# Patient Record
Sex: MALE | Race: OTHER | HISPANIC OR LATINO | URBAN - METROPOLITAN AREA
[De-identification: names, ages, dates, MRNs, and addresses within clinical notes are randomized per-mention and may not be internally consistent; named-entity substitution may affect disease eponyms.]

---

## 2018-03-17 ENCOUNTER — INPATIENT (INPATIENT)
Facility: HOSPITAL | Age: 38
LOS: 2 days | Discharge: HOME | DRG: 494 | End: 2018-03-20
Attending: ORTHOPAEDIC SURGERY | Admitting: ORTHOPAEDIC SURGERY
Payer: COMMERCIAL

## 2018-03-17 VITALS
TEMPERATURE: 100 F | SYSTOLIC BLOOD PRESSURE: 129 MMHG | RESPIRATION RATE: 18 BRPM | HEART RATE: 82 BPM | DIASTOLIC BLOOD PRESSURE: 78 MMHG | HEIGHT: 68.9 IN | OXYGEN SATURATION: 97 % | WEIGHT: 176.37 LBS

## 2018-03-17 DIAGNOSIS — Z98.890 OTHER SPECIFIED POSTPROCEDURAL STATES: Chronic | ICD-10-CM

## 2018-03-17 LAB
ALBUMIN SERPL ELPH-MCNC: 5.1 G/DL — HIGH (ref 3.3–5)
ALP SERPL-CCNC: 96 U/L — SIGNIFICANT CHANGE UP (ref 40–120)
ALT FLD-CCNC: 23 U/L — SIGNIFICANT CHANGE UP (ref 10–45)
ANION GAP SERPL CALC-SCNC: 15 MMOL/L — SIGNIFICANT CHANGE UP (ref 5–17)
APTT BLD: 26.6 SEC — LOW (ref 27.5–37.4)
AST SERPL-CCNC: 21 U/L — SIGNIFICANT CHANGE UP (ref 10–40)
BASOPHILS NFR BLD AUTO: 0.2 % — SIGNIFICANT CHANGE UP (ref 0–2)
BILIRUB SERPL-MCNC: 0.4 MG/DL — SIGNIFICANT CHANGE UP (ref 0.2–1.2)
BLD GP AB SCN SERPL QL: NEGATIVE — SIGNIFICANT CHANGE UP
BUN SERPL-MCNC: 16 MG/DL — SIGNIFICANT CHANGE UP (ref 7–23)
CALCIUM SERPL-MCNC: 9.8 MG/DL — SIGNIFICANT CHANGE UP (ref 8.4–10.5)
CHLORIDE SERPL-SCNC: 100 MMOL/L — SIGNIFICANT CHANGE UP (ref 96–108)
CO2 SERPL-SCNC: 24 MMOL/L — SIGNIFICANT CHANGE UP (ref 22–31)
CREAT SERPL-MCNC: 0.85 MG/DL — SIGNIFICANT CHANGE UP (ref 0.5–1.3)
EOSINOPHIL NFR BLD AUTO: 1 % — SIGNIFICANT CHANGE UP (ref 0–6)
GLUCOSE SERPL-MCNC: 105 MG/DL — HIGH (ref 70–99)
HCT VFR BLD CALC: 43 % — SIGNIFICANT CHANGE UP (ref 39–50)
HGB BLD-MCNC: 14.5 G/DL — SIGNIFICANT CHANGE UP (ref 13–17)
INR BLD: 1.08 — SIGNIFICANT CHANGE UP (ref 0.88–1.16)
LYMPHOCYTES # BLD AUTO: 16.7 % — SIGNIFICANT CHANGE UP (ref 13–44)
MCHC RBC-ENTMCNC: 28.8 PG — SIGNIFICANT CHANGE UP (ref 27–34)
MCHC RBC-ENTMCNC: 33.7 G/DL — SIGNIFICANT CHANGE UP (ref 32–36)
MCV RBC AUTO: 85.3 FL — SIGNIFICANT CHANGE UP (ref 80–100)
MONOCYTES NFR BLD AUTO: 5.8 % — SIGNIFICANT CHANGE UP (ref 2–14)
NEUTROPHILS NFR BLD AUTO: 76.3 % — SIGNIFICANT CHANGE UP (ref 43–77)
PLATELET # BLD AUTO: 199 K/UL — SIGNIFICANT CHANGE UP (ref 150–400)
POTASSIUM SERPL-MCNC: 4 MMOL/L — SIGNIFICANT CHANGE UP (ref 3.5–5.3)
POTASSIUM SERPL-SCNC: 4 MMOL/L — SIGNIFICANT CHANGE UP (ref 3.5–5.3)
PROT SERPL-MCNC: 8.1 G/DL — SIGNIFICANT CHANGE UP (ref 6–8.3)
PROTHROM AB SERPL-ACNC: 12 SEC — SIGNIFICANT CHANGE UP (ref 9.8–12.7)
RBC # BLD: 5.04 M/UL — SIGNIFICANT CHANGE UP (ref 4.2–5.8)
RBC # FLD: 13.3 % — SIGNIFICANT CHANGE UP (ref 10.3–16.9)
RH IG SCN BLD-IMP: POSITIVE — SIGNIFICANT CHANGE UP
RH IG SCN BLD-IMP: POSITIVE — SIGNIFICANT CHANGE UP
SODIUM SERPL-SCNC: 139 MMOL/L — SIGNIFICANT CHANGE UP (ref 135–145)
WBC # BLD: 11.4 K/UL — HIGH (ref 3.8–10.5)
WBC # FLD AUTO: 11.4 K/UL — HIGH (ref 3.8–10.5)

## 2018-03-17 PROCEDURE — 73590 X-RAY EXAM OF LOWER LEG: CPT | Mod: 26,LT

## 2018-03-17 PROCEDURE — 73610 X-RAY EXAM OF ANKLE: CPT | Mod: 26,LT

## 2018-03-17 PROCEDURE — 71045 X-RAY EXAM CHEST 1 VIEW: CPT | Mod: 26

## 2018-03-17 PROCEDURE — 99285 EMERGENCY DEPT VISIT HI MDM: CPT | Mod: 25

## 2018-03-17 PROCEDURE — 93010 ELECTROCARDIOGRAM REPORT: CPT

## 2018-03-17 RX ORDER — MORPHINE SULFATE 50 MG/1
2 CAPSULE, EXTENDED RELEASE ORAL EVERY 4 HOURS
Qty: 0 | Refills: 0 | Status: DISCONTINUED | OUTPATIENT
Start: 2018-03-17 | End: 2018-03-18

## 2018-03-17 RX ORDER — MORPHINE SULFATE 50 MG/1
4 CAPSULE, EXTENDED RELEASE ORAL ONCE
Qty: 0 | Refills: 0 | Status: DISCONTINUED | OUTPATIENT
Start: 2018-03-17 | End: 2018-03-17

## 2018-03-17 RX ORDER — MORPHINE SULFATE 50 MG/1
6 CAPSULE, EXTENDED RELEASE ORAL ONCE
Qty: 0 | Refills: 0 | Status: DISCONTINUED | OUTPATIENT
Start: 2018-03-17 | End: 2018-03-17

## 2018-03-17 RX ORDER — HYDROMORPHONE HYDROCHLORIDE 2 MG/ML
0.5 INJECTION INTRAMUSCULAR; INTRAVENOUS; SUBCUTANEOUS EVERY 6 HOURS
Qty: 0 | Refills: 0 | Status: DISCONTINUED | OUTPATIENT
Start: 2018-03-17 | End: 2018-03-18

## 2018-03-17 RX ORDER — OXYCODONE HYDROCHLORIDE 5 MG/1
10 TABLET ORAL EVERY 4 HOURS
Qty: 0 | Refills: 0 | Status: DISCONTINUED | OUTPATIENT
Start: 2018-03-17 | End: 2018-03-18

## 2018-03-17 RX ORDER — ACETAMINOPHEN 500 MG
650 TABLET ORAL EVERY 6 HOURS
Qty: 0 | Refills: 0 | Status: DISCONTINUED | OUTPATIENT
Start: 2018-03-17 | End: 2018-03-20

## 2018-03-17 RX ORDER — OXYCODONE HYDROCHLORIDE 5 MG/1
5 TABLET ORAL EVERY 4 HOURS
Qty: 0 | Refills: 0 | Status: DISCONTINUED | OUTPATIENT
Start: 2018-03-17 | End: 2018-03-18

## 2018-03-17 RX ORDER — METOCLOPRAMIDE HCL 10 MG
10 TABLET ORAL EVERY 6 HOURS
Qty: 0 | Refills: 0 | Status: DISCONTINUED | OUTPATIENT
Start: 2018-03-17 | End: 2018-03-20

## 2018-03-17 RX ORDER — ZOLPIDEM TARTRATE 10 MG/1
5 TABLET ORAL AT BEDTIME
Qty: 0 | Refills: 0 | Status: DISCONTINUED | OUTPATIENT
Start: 2018-03-17 | End: 2018-03-20

## 2018-03-17 RX ORDER — MAGNESIUM HYDROXIDE 400 MG/1
30 TABLET, CHEWABLE ORAL DAILY
Qty: 0 | Refills: 0 | Status: DISCONTINUED | OUTPATIENT
Start: 2018-03-17 | End: 2018-03-20

## 2018-03-17 RX ORDER — SODIUM CHLORIDE 9 MG/ML
1000 INJECTION, SOLUTION INTRAVENOUS
Qty: 0 | Refills: 0 | Status: DISCONTINUED | OUTPATIENT
Start: 2018-03-17 | End: 2018-03-20

## 2018-03-17 RX ORDER — INFLUENZA VIRUS VACCINE 15; 15; 15; 15 UG/.5ML; UG/.5ML; UG/.5ML; UG/.5ML
0.5 SUSPENSION INTRAMUSCULAR ONCE
Qty: 0 | Refills: 0 | Status: COMPLETED | OUTPATIENT
Start: 2018-03-17 | End: 2018-03-17

## 2018-03-17 RX ORDER — HYDROMORPHONE HYDROCHLORIDE 2 MG/ML
1 INJECTION INTRAMUSCULAR; INTRAVENOUS; SUBCUTANEOUS ONCE
Qty: 0 | Refills: 0 | Status: DISCONTINUED | OUTPATIENT
Start: 2018-03-17 | End: 2018-03-17

## 2018-03-17 RX ORDER — FAMOTIDINE 10 MG/ML
20 INJECTION INTRAVENOUS EVERY 12 HOURS
Qty: 0 | Refills: 0 | Status: DISCONTINUED | OUTPATIENT
Start: 2018-03-17 | End: 2018-03-20

## 2018-03-17 RX ADMIN — SODIUM CHLORIDE 100 MILLILITER(S): 9 INJECTION, SOLUTION INTRAVENOUS at 23:56

## 2018-03-17 RX ADMIN — Medication 650 MILLIGRAM(S): at 23:52

## 2018-03-17 RX ADMIN — MORPHINE SULFATE 4 MILLIGRAM(S): 50 CAPSULE, EXTENDED RELEASE ORAL at 20:25

## 2018-03-17 RX ADMIN — MORPHINE SULFATE 4 MILLIGRAM(S): 50 CAPSULE, EXTENDED RELEASE ORAL at 17:50

## 2018-03-17 RX ADMIN — FAMOTIDINE 20 MILLIGRAM(S): 10 INJECTION INTRAVENOUS at 23:57

## 2018-03-17 RX ADMIN — HYDROMORPHONE HYDROCHLORIDE 1 MILLIGRAM(S): 2 INJECTION INTRAMUSCULAR; INTRAVENOUS; SUBCUTANEOUS at 21:39

## 2018-03-17 RX ADMIN — MORPHINE SULFATE 2 MILLIGRAM(S): 50 CAPSULE, EXTENDED RELEASE ORAL at 23:52

## 2018-03-17 NOTE — PROGRESS NOTE ADULT - SUBJECTIVE AND OBJECTIVE BOX
Ortho Preop Note    Patient is a 37y old  Male who presents with a chief complaint of   Diagnosis: L ankle fx  Procedure: L ankle ORIF   Surgeon: chase                           14.5   11.4  )-----------( 199      ( 17 Mar 2018 20:46 )             43.0     03-17    139  |  100  |  16  ----------------------------<  105<H>  4.0   |  24  |  0.85    Ca    9.8      17 Mar 2018 20:46    TPro  8.1  /  Alb  5.1<H>  /  TBili  0.4  /  DBili  x   /  AST  21  /  ALT  23  /  AlkPhos  96  03-17    PT/INR - ( 17 Mar 2018 20:46 )   PT: 12.0 sec;   INR: 1.08          PTT - ( 17 Mar 2018 20:46 )  PTT:26.6 sec      [ ] Type & Screen  [x ] CBC  [x ] BMP  [ ] PT/PTT/INR  [ ] Urinalysis  [ ] Chest X-ray  [x ] EKG  [x ] NPO/IVF  [x ] Consent  [ ] Clearance  [x ] Added on to OR Schedule  [ ] Anti-coagulation held  [ ] MRSA/MSSA Nasal Screen     Assessment & Plan:  37yMale with (L ankle fx  -For OR 3/18     Ortho Pager [phone]

## 2018-03-17 NOTE — ED PROVIDER NOTE - OBJECTIVE STATEMENT
37 m without significant PMHx BIBA after fall while ice skating; pt was doing  a turn to the right when his skate became caught on the ice, and he twisted his left ankle; he heard a loud crack.  Pain is severe.  Has some numbness in the foot.  Denies head injury or injuries to any other parts of his body. 37 m without significant PMHx BIBA after fall while ice skating; pt was doing  a turn to the right when his skate became caught on the ice, and he twisted his left ankle; he heard a loud crack.  Pain is severe.  Worse with even minimal movement, unable to walk. Has some numbness in the foot.  Denies head injury or injuries to any other parts of his body.

## 2018-03-17 NOTE — ED PROVIDER NOTE - NS ED ROS FT
NEURO: no LOC, headache, dizziness, weakness, focal weakness/tingling/numbness   EYE: no change in vision or eye injury   ENT: no epistaxis, rhinorrhea, otorrhea, dental injury   PULM: no difficulty breathing   CV: no chest pain or palpitations; no syncope   GI: no abdominal pain or vomiting   : no pelvic pain, no genital injury   MSK: no neck or back pain  SKIN: no abrasions or lacerations   IMMUN: tetanus up to date

## 2018-03-17 NOTE — ED ADULT NURSE NOTE - OBJECTIVE STATEMENT
pt to ER via EMS w/ report of falling while ice skating and injuring L. ankle.  Pt denies striking head or other injury.  Swelling and deformity noted to L. malleolus.  Pt denies cp/sob/f/c/n/v.  Breathing unlabored, skin warm and dry. Will continue to monitor.

## 2018-03-17 NOTE — H&P ADULT - ASSESSMENT
37M with L SER-IV equivalent ankle fracture, to OR tomorrow for ORIF     Patient was consented for ankle block and closed reduction of the L ankle in the ER. The reduction was performed with ER assistants and splint applied. Post-splint images demonstrated better anatomic alignment of the mortise. Physical exam was repeated after splint with no interval change.     Admit to Orthopaedics  NPO at midnight  IVF  Pain control  Labs  UA  type and scree   EKG  Discussed with attending, Dr. Beck

## 2018-03-17 NOTE — H&P ADULT - NSHPLABSRESULTS_GEN_ALL_CORE
XR: L ankle SER IV equivalent fracture with Medial clear space widening and fracture through distal fibula

## 2018-03-17 NOTE — H&P ADULT - NSHPPHYSICALEXAM_GEN_ALL_CORE
NAD AAO3   LLE: diffuse swelling around both malleoli, ttp, no leilani ecchymosis  SILT s/s/sp/dp/t, +FHL/EHL. PF/DF limited 2/2 pain. +KE/KF  +PT, toes wwp

## 2018-03-17 NOTE — H&P ADULT - HISTORY OF PRESENT ILLNESS
37M otherwise healthy, visiting [facility] with wife, p/w L ankle pain after sustaining twisting injury at [place] while ice skating. Immediately pain upon impact with inability to walk sencondary to pain. Patient previously ambulated indepdently. No LOC, no head trauma. Denies numbness/tingling in foot. No N/V/F/C.

## 2018-03-17 NOTE — PATIENT PROFILE ADULT. - IF HCP IS NOT ON CHART, IDENTIFY THE PERSONS NAME AND PHONE NUMBER CONTACTED TO BRING IN DOCUMENT
[place] patient; spouse is staying [facility] room [facility]

## 2018-03-17 NOTE — ED PROVIDER NOTE - MEDICAL DECISION MAKING DETAILS
Isolated left ankle injury, likely fracture.  NVI.  No evidence of head/neck or other injuries. Will obtain XR.

## 2018-03-17 NOTE — ED PROVIDER NOTE - PHYSICAL EXAMINATION
CONSTITUTIONAL: WD,WN. NAD.    SKIN: Normal color and turgor. No rash.    HEAD: NC/AT.  EYES: Conjunctiva clear. EOMI. PERRL.    ENT: Airway patent, OP without erythema, tonsillar swelling or exudate; uvula midline without swelling. Nasal mucosa clear, no rhinorrhea.   RESPIRATORY:  Breathing non-labored. No retractions or accessory muscle use.  Lungs CTA bilat.  CARDIOVASCULAR:  RRR, S1S2. No M/R/G.      GI:  Abdomen soft, nontender.    MSK: Neck supple with painless ROM.  No midline neck tenderness. Left ankle very swollen and focally tender over lateral malleolus.  No calf tenderness.  No foot tenderness.  Strong DP and PT pulses, toes WWP.  Able to wiggle toes.  Foot and toes SILT.  NEURO: Alert and oriented; 5/5 strength in all extremities.

## 2018-03-18 LAB
ANION GAP SERPL CALC-SCNC: 14 MMOL/L — SIGNIFICANT CHANGE UP (ref 5–17)
APPEARANCE UR: CLEAR — SIGNIFICANT CHANGE UP
BILIRUB UR-MCNC: NEGATIVE — SIGNIFICANT CHANGE UP
BUN SERPL-MCNC: 15 MG/DL — SIGNIFICANT CHANGE UP (ref 7–23)
CALCIUM SERPL-MCNC: 9.3 MG/DL — SIGNIFICANT CHANGE UP (ref 8.4–10.5)
CHLORIDE SERPL-SCNC: 97 MMOL/L — SIGNIFICANT CHANGE UP (ref 96–108)
CO2 SERPL-SCNC: 25 MMOL/L — SIGNIFICANT CHANGE UP (ref 22–31)
COLOR SPEC: YELLOW — SIGNIFICANT CHANGE UP
CREAT SERPL-MCNC: 0.82 MG/DL — SIGNIFICANT CHANGE UP (ref 0.5–1.3)
DIFF PNL FLD: NEGATIVE — SIGNIFICANT CHANGE UP
GLUCOSE SERPL-MCNC: 138 MG/DL — HIGH (ref 70–99)
GLUCOSE UR QL: NEGATIVE — SIGNIFICANT CHANGE UP
HCT VFR BLD CALC: 40.6 % — SIGNIFICANT CHANGE UP (ref 39–50)
HGB BLD-MCNC: 13.4 G/DL — SIGNIFICANT CHANGE UP (ref 13–17)
INR BLD: 1.06 — SIGNIFICANT CHANGE UP (ref 0.88–1.16)
KETONES UR-MCNC: NEGATIVE — SIGNIFICANT CHANGE UP
LEUKOCYTE ESTERASE UR-ACNC: NEGATIVE — SIGNIFICANT CHANGE UP
MCHC RBC-ENTMCNC: 29.2 PG — SIGNIFICANT CHANGE UP (ref 27–34)
MCHC RBC-ENTMCNC: 33 G/DL — SIGNIFICANT CHANGE UP (ref 32–36)
MCV RBC AUTO: 88.5 FL — SIGNIFICANT CHANGE UP (ref 80–100)
MRSA PCR RESULT.: NEGATIVE — SIGNIFICANT CHANGE UP
NITRITE UR-MCNC: NEGATIVE — SIGNIFICANT CHANGE UP
PH UR: 6 — SIGNIFICANT CHANGE UP (ref 5–8)
PLATELET # BLD AUTO: 161 K/UL — SIGNIFICANT CHANGE UP (ref 150–400)
POTASSIUM SERPL-MCNC: 4.1 MMOL/L — SIGNIFICANT CHANGE UP (ref 3.5–5.3)
POTASSIUM SERPL-SCNC: 4.1 MMOL/L — SIGNIFICANT CHANGE UP (ref 3.5–5.3)
PROT UR-MCNC: NEGATIVE MG/DL — SIGNIFICANT CHANGE UP
PROTHROM AB SERPL-ACNC: 11.8 SEC — SIGNIFICANT CHANGE UP (ref 9.8–12.7)
RBC # BLD: 4.59 M/UL — SIGNIFICANT CHANGE UP (ref 4.2–5.8)
RBC # FLD: 13.4 % — SIGNIFICANT CHANGE UP (ref 10.3–16.9)
S AUREUS DNA NOSE QL NAA+PROBE: POSITIVE
SODIUM SERPL-SCNC: 136 MMOL/L — SIGNIFICANT CHANGE UP (ref 135–145)
SP GR SPEC: 1.02 — SIGNIFICANT CHANGE UP (ref 1–1.03)
UROBILINOGEN FLD QL: 0.2 E.U./DL — SIGNIFICANT CHANGE UP
WBC # BLD: 12.8 K/UL — HIGH (ref 3.8–10.5)
WBC # FLD AUTO: 12.8 K/UL — HIGH (ref 3.8–10.5)

## 2018-03-18 RX ORDER — ENOXAPARIN SODIUM 100 MG/ML
40 INJECTION SUBCUTANEOUS DAILY
Qty: 0 | Refills: 0 | Status: DISCONTINUED | OUTPATIENT
Start: 2018-03-18 | End: 2018-03-20

## 2018-03-18 RX ORDER — HYDROMORPHONE HYDROCHLORIDE 2 MG/ML
0.5 INJECTION INTRAMUSCULAR; INTRAVENOUS; SUBCUTANEOUS
Qty: 0 | Refills: 0 | Status: DISCONTINUED | OUTPATIENT
Start: 2018-03-18 | End: 2018-03-20

## 2018-03-18 RX ORDER — CEFAZOLIN SODIUM 1 G
2000 VIAL (EA) INJECTION EVERY 8 HOURS
Qty: 0 | Refills: 0 | Status: COMPLETED | OUTPATIENT
Start: 2018-03-19 | End: 2018-03-19

## 2018-03-18 RX ORDER — OXYCODONE HYDROCHLORIDE 5 MG/1
10 TABLET ORAL EVERY 4 HOURS
Qty: 0 | Refills: 0 | Status: DISCONTINUED | OUTPATIENT
Start: 2018-03-18 | End: 2018-03-20

## 2018-03-18 RX ORDER — SODIUM CHLORIDE 9 MG/ML
1000 INJECTION, SOLUTION INTRAVENOUS
Qty: 0 | Refills: 0 | Status: DISCONTINUED | OUTPATIENT
Start: 2018-03-18 | End: 2018-03-20

## 2018-03-18 RX ORDER — OXYCODONE HYDROCHLORIDE 5 MG/1
5 TABLET ORAL EVERY 4 HOURS
Qty: 0 | Refills: 0 | Status: DISCONTINUED | OUTPATIENT
Start: 2018-03-18 | End: 2018-03-20

## 2018-03-18 RX ORDER — ONDANSETRON 8 MG/1
4 TABLET, FILM COATED ORAL EVERY 6 HOURS
Qty: 0 | Refills: 0 | Status: DISCONTINUED | OUTPATIENT
Start: 2018-03-18 | End: 2018-03-20

## 2018-03-18 RX ADMIN — OXYCODONE HYDROCHLORIDE 10 MILLIGRAM(S): 5 TABLET ORAL at 03:38

## 2018-03-18 RX ADMIN — HYDROMORPHONE HYDROCHLORIDE 0.5 MILLIGRAM(S): 2 INJECTION INTRAMUSCULAR; INTRAVENOUS; SUBCUTANEOUS at 19:35

## 2018-03-18 RX ADMIN — ZOLPIDEM TARTRATE 5 MILLIGRAM(S): 10 TABLET ORAL at 01:59

## 2018-03-18 RX ADMIN — OXYCODONE HYDROCHLORIDE 10 MILLIGRAM(S): 5 TABLET ORAL at 11:39

## 2018-03-18 RX ADMIN — HYDROMORPHONE HYDROCHLORIDE 0.5 MILLIGRAM(S): 2 INJECTION INTRAMUSCULAR; INTRAVENOUS; SUBCUTANEOUS at 00:54

## 2018-03-18 RX ADMIN — Medication 650 MILLIGRAM(S): at 22:45

## 2018-03-18 RX ADMIN — HYDROMORPHONE HYDROCHLORIDE 0.5 MILLIGRAM(S): 2 INJECTION INTRAMUSCULAR; INTRAVENOUS; SUBCUTANEOUS at 20:10

## 2018-03-18 RX ADMIN — Medication 650 MILLIGRAM(S): at 06:25

## 2018-03-18 RX ADMIN — MORPHINE SULFATE 2 MILLIGRAM(S): 50 CAPSULE, EXTENDED RELEASE ORAL at 03:46

## 2018-03-18 RX ADMIN — Medication 650 MILLIGRAM(S): at 12:01

## 2018-03-18 RX ADMIN — MORPHINE SULFATE 2 MILLIGRAM(S): 50 CAPSULE, EXTENDED RELEASE ORAL at 00:07

## 2018-03-18 RX ADMIN — HYDROMORPHONE HYDROCHLORIDE 0.5 MILLIGRAM(S): 2 INJECTION INTRAMUSCULAR; INTRAVENOUS; SUBCUTANEOUS at 01:09

## 2018-03-18 RX ADMIN — MORPHINE SULFATE 2 MILLIGRAM(S): 50 CAPSULE, EXTENDED RELEASE ORAL at 05:01

## 2018-03-18 RX ADMIN — OXYCODONE HYDROCHLORIDE 10 MILLIGRAM(S): 5 TABLET ORAL at 12:02

## 2018-03-18 RX ADMIN — Medication 650 MILLIGRAM(S): at 00:52

## 2018-03-18 RX ADMIN — Medication 650 MILLIGRAM(S): at 21:45

## 2018-03-18 RX ADMIN — MORPHINE SULFATE 2 MILLIGRAM(S): 50 CAPSULE, EXTENDED RELEASE ORAL at 16:18

## 2018-03-18 RX ADMIN — Medication 650 MILLIGRAM(S): at 11:41

## 2018-03-18 RX ADMIN — HYDROMORPHONE HYDROCHLORIDE 0.5 MILLIGRAM(S): 2 INJECTION INTRAMUSCULAR; INTRAVENOUS; SUBCUTANEOUS at 21:45

## 2018-03-18 RX ADMIN — OXYCODONE HYDROCHLORIDE 10 MILLIGRAM(S): 5 TABLET ORAL at 02:38

## 2018-03-18 RX ADMIN — HYDROMORPHONE HYDROCHLORIDE 0.5 MILLIGRAM(S): 2 INJECTION INTRAMUSCULAR; INTRAVENOUS; SUBCUTANEOUS at 20:11

## 2018-03-18 RX ADMIN — Medication 650 MILLIGRAM(S): at 05:25

## 2018-03-18 RX ADMIN — MORPHINE SULFATE 2 MILLIGRAM(S): 50 CAPSULE, EXTENDED RELEASE ORAL at 15:58

## 2018-03-18 NOTE — BRIEF OPERATIVE NOTE - PROCEDURE
<<-----Click on this checkbox to enter Procedure ORIF fracture of left ankle  03/18/2018    Active  RAHEEM

## 2018-03-18 NOTE — PROGRESS NOTE ADULT - SUBJECTIVE AND OBJECTIVE BOX
Ortho Note    Pain contrlled. REady for OR today.   Denies CP, SOB, N/V, numbness/tingling     Vital Signs Last 24 Hrs  T(C): 36.8 (03-18-18 @ 05:16), Max: 36.8 (03-18-18 @ 05:16)  T(F): 98.2 (03-18-18 @ 05:16), Max: 98.2 (03-18-18 @ 05:16)  HR: 72 (03-18-18 @ 05:16) (72 - 88)  BP: 120/83 (03-18-18 @ 05:16) (120/83 - 136/88)  BP(mean): --  RR: 18 (03-18-18 @ 05:16) (18 - 18)  SpO2: 97% (03-18-18 @ 05:16) (97% - 97%)  AVSS    General: Pt Alert and oriented, NAD  Splint c/d/i, elevated.   Toes wwp   [place] distally.   +EHL/FHL                           13.4   12.8  )-----------( 161      ( 18 Mar 2018 06:30 )             40.6   18 Mar 2018 06:30    136    |  97     |  15     ----------------------------<  138    4.1     |  25     |  0.82     Ca    9.3        18 Mar 2018 06:30    TPro  8.1    /  Alb  5.1    /  TBili  0.4    /  DBili  x      /  AST  21     /  ALT  23     /  AlkPhos  96     17 Mar 2018 20:46      A/P: 37yMale s/p L ankle fx   - OR today   - Stable  - Pain Control  - PT, WBS: NWB    Ortho Pager [phone]

## 2018-03-18 NOTE — PROGRESS NOTE ADULT - SUBJECTIVE AND OBJECTIVE BOX
Orthopaedics Post Op Check    Procedure: L Ankle ORIF  Surgeon: Dr Beck    Pt comfortable, without complaints  Denies CP, SOB, N/V, numbness/tingling     Vital Signs Last 24 Hrs  T(C): 37 (18 Mar 2018 21:31), Max: 37 (18 Mar 2018 21:31)  T(F): 98.6 (18 Mar 2018 21:31), Max: 98.6 (18 Mar 2018 21:31)  HR: 86 (18 Mar 2018 21:31) (70 - 92)  BP: 115/70 (18 Mar 2018 21:31) (107/67 - 150/78)  BP(mean): 91 (18 Mar 2018 21:00) (83 - 113)  RR: 18 (18 Mar 2018 21:31) (15 - 18)  SpO2: 100% (18 Mar 2018 21:31) (96% - 100%)  AVSS, NAD    Dressing C/D/I  General: Pt Alert and oriented   wwp  Sensation: SILT  Motor: able to range digits w/ pain at sx site                          13.4   12.8  )-----------( 161      ( 18 Mar 2018 06:30 )             40.6   03-18    136  |  97  |  15  ----------------------------<  138<H>  4.1   |  25  |  0.82    Ca    9.3      18 Mar 2018 06:30    TPro  8.1  /  Alb  5.1<H>  /  TBili  0.4  /  DBili  x   /  AST  21  /  ALT  23  /  AlkPhos  96  03-17      A/P: 37yMale POD#0 s/p above  - Stable  - Pain Control  - DVT ppx  - Post op abx  - PT, WBS: NWB LLE  - F/U AM Labs

## 2018-03-19 LAB
ANION GAP SERPL CALC-SCNC: 13 MMOL/L — SIGNIFICANT CHANGE UP (ref 5–17)
BUN SERPL-MCNC: 8 MG/DL — SIGNIFICANT CHANGE UP (ref 7–23)
CALCIUM SERPL-MCNC: 9.2 MG/DL — SIGNIFICANT CHANGE UP (ref 8.4–10.5)
CHLORIDE SERPL-SCNC: 100 MMOL/L — SIGNIFICANT CHANGE UP (ref 96–108)
CO2 SERPL-SCNC: 25 MMOL/L — SIGNIFICANT CHANGE UP (ref 22–31)
CREAT SERPL-MCNC: 0.75 MG/DL — SIGNIFICANT CHANGE UP (ref 0.5–1.3)
GLUCOSE SERPL-MCNC: 173 MG/DL — HIGH (ref 70–99)
HCT VFR BLD CALC: 39.6 % — SIGNIFICANT CHANGE UP (ref 39–50)
HGB BLD-MCNC: 13 G/DL — SIGNIFICANT CHANGE UP (ref 13–17)
MCHC RBC-ENTMCNC: 29.2 PG — SIGNIFICANT CHANGE UP (ref 27–34)
MCHC RBC-ENTMCNC: 32.8 G/DL — SIGNIFICANT CHANGE UP (ref 32–36)
MCV RBC AUTO: 89 FL — SIGNIFICANT CHANGE UP (ref 80–100)
PLATELET # BLD AUTO: 146 K/UL — LOW (ref 150–400)
POTASSIUM SERPL-MCNC: 4.8 MMOL/L — SIGNIFICANT CHANGE UP (ref 3.5–5.3)
POTASSIUM SERPL-SCNC: 4.8 MMOL/L — SIGNIFICANT CHANGE UP (ref 3.5–5.3)
RBC # BLD: 4.45 M/UL — SIGNIFICANT CHANGE UP (ref 4.2–5.8)
RBC # FLD: 13.1 % — SIGNIFICANT CHANGE UP (ref 10.3–16.9)
SODIUM SERPL-SCNC: 138 MMOL/L — SIGNIFICANT CHANGE UP (ref 135–145)
WBC # BLD: 9.4 K/UL — SIGNIFICANT CHANGE UP (ref 3.8–10.5)
WBC # FLD AUTO: 9.4 K/UL — SIGNIFICANT CHANGE UP (ref 3.8–10.5)

## 2018-03-19 RX ADMIN — Medication 650 MILLIGRAM(S): at 23:05

## 2018-03-19 RX ADMIN — OXYCODONE HYDROCHLORIDE 10 MILLIGRAM(S): 5 TABLET ORAL at 13:29

## 2018-03-19 RX ADMIN — Medication 650 MILLIGRAM(S): at 05:52

## 2018-03-19 RX ADMIN — OXYCODONE HYDROCHLORIDE 10 MILLIGRAM(S): 5 TABLET ORAL at 09:36

## 2018-03-19 RX ADMIN — Medication 100 MILLIGRAM(S): at 00:00

## 2018-03-19 RX ADMIN — Medication 100 MILLIGRAM(S): at 07:34

## 2018-03-19 RX ADMIN — OXYCODONE HYDROCHLORIDE 10 MILLIGRAM(S): 5 TABLET ORAL at 14:20

## 2018-03-19 RX ADMIN — Medication 650 MILLIGRAM(S): at 23:55

## 2018-03-19 RX ADMIN — Medication 650 MILLIGRAM(S): at 13:14

## 2018-03-19 RX ADMIN — OXYCODONE HYDROCHLORIDE 10 MILLIGRAM(S): 5 TABLET ORAL at 17:45

## 2018-03-19 RX ADMIN — ENOXAPARIN SODIUM 40 MILLIGRAM(S): 100 INJECTION SUBCUTANEOUS at 12:42

## 2018-03-19 RX ADMIN — HYDROMORPHONE HYDROCHLORIDE 0.5 MILLIGRAM(S): 2 INJECTION INTRAMUSCULAR; INTRAVENOUS; SUBCUTANEOUS at 03:05

## 2018-03-19 RX ADMIN — Medication 650 MILLIGRAM(S): at 17:45

## 2018-03-19 RX ADMIN — HYDROMORPHONE HYDROCHLORIDE 0.5 MILLIGRAM(S): 2 INJECTION INTRAMUSCULAR; INTRAVENOUS; SUBCUTANEOUS at 05:52

## 2018-03-19 RX ADMIN — HYDROMORPHONE HYDROCHLORIDE 0.5 MILLIGRAM(S): 2 INJECTION INTRAMUSCULAR; INTRAVENOUS; SUBCUTANEOUS at 06:30

## 2018-03-19 RX ADMIN — OXYCODONE HYDROCHLORIDE 10 MILLIGRAM(S): 5 TABLET ORAL at 23:05

## 2018-03-19 RX ADMIN — FAMOTIDINE 20 MILLIGRAM(S): 10 INJECTION INTRAVENOUS at 05:52

## 2018-03-19 RX ADMIN — OXYCODONE HYDROCHLORIDE 10 MILLIGRAM(S): 5 TABLET ORAL at 23:55

## 2018-03-19 RX ADMIN — Medication 650 MILLIGRAM(S): at 06:30

## 2018-03-19 RX ADMIN — Medication 650 MILLIGRAM(S): at 12:36

## 2018-03-19 RX ADMIN — FAMOTIDINE 20 MILLIGRAM(S): 10 INJECTION INTRAVENOUS at 17:45

## 2018-03-19 RX ADMIN — HYDROMORPHONE HYDROCHLORIDE 0.5 MILLIGRAM(S): 2 INJECTION INTRAMUSCULAR; INTRAVENOUS; SUBCUTANEOUS at 02:43

## 2018-03-19 RX ADMIN — OXYCODONE HYDROCHLORIDE 10 MILLIGRAM(S): 5 TABLET ORAL at 10:20

## 2018-03-19 NOTE — PROGRESS NOTE ADULT - SUBJECTIVE AND OBJECTIVE BOX
ORTHO NOTE    [ x] Pt seen/examined at 925am.  [ x] Pt without any complaints/in NAD.    [ ] Pt complains of:      ROS: [ ] Fever  [ ] Chills  [ ] CP [ ] SOB [ ] Dysnea  [ ] Palpitations [ ] Cough [ ] N/V/C/D [ ] Paresthia [ ] Other     [x ] ROS  otherwise negative    .    PHYSICAL EXAM:    Vital Signs Last 24 Hrs  T(C): 36.9 (19 Mar 2018 15:38), Max: 37 (18 Mar 2018 21:31)  T(F): 98.4 (19 Mar 2018 15:38), Max: 98.6 (18 Mar 2018 21:31)  HR: 101 (19 Mar 2018 15:38) (80 - 107)  BP: 111/65 (19 Mar 2018 15:38) (107/67 - 150/78)  BP(mean): 91 (18 Mar 2018 21:00) (83 - 113)  RR: 16 (19 Mar 2018 15:38) (15 - 18)  SpO2: 94% (19 Mar 2018 15:38) (94% - 100%)    I&O's Detail    18 Mar 2018 07:01  -  19 Mar 2018 07:00  --------------------------------------------------------  IN:    lactated ringers.: 735 mL    Oral Fluid: 480 mL    Other: 1000 mL  Total IN: 2215 mL    OUT:    Voided: 950 mL  Total OUT: 950 mL    Total NET: 1265 mL      19 Mar 2018 07:01  -  19 Mar 2018 16:00  --------------------------------------------------------  IN:    Oral Fluid: 660 mL  Total IN: 660 mL    OUT:    Voided: 880 mL  Total OUT: 880 mL    Total NET: -220 mL           CAPILLARY BLOOD GLUCOSE                      Neuro: AAOx3    Lungs: CTA, IS demonstrated    CV:    ABD: soft non tender    Ext: L ankle splinted with acewrap bandage cdi, LLE nvid, elevated on pillow, motor-able to wiggle toes    LABS                        13.0   9.4   )-----------( 146      ( 19 Mar 2018 05:42 )             39.6                              PT/INR - ( 18 Mar 2018 06:30 )   PT: 11.8 sec;   INR: 1.06          PTT - ( 17 Mar 2018 20:46 )  PTT:26.6 sec  03-19    138  |  100  |  8   ----------------------------<  173<H>  4.8   |  25  |  0.75    Ca    9.2      19 Mar 2018 05:42    TPro  8.1  /  Alb  5.1<H>  /  TBili  0.4  /  DBili  x   /  AST  21  /  ALT  23  /  AlkPhos  96  03-17      [ ] Other Labs  [ ] None ordered            Please check or [place] when present:  •  Heart Failure:    [ ] Acute        [ ]  Acute on Chronic        [ ] Chronic         [ ] Diastolic     [ ]  Combined    •  NASIR:     [ ] ATN        [ ]  Renal medullary necrosis       [ ]  Renal cortical necrosis                  [ ] Other pathological Lesion:  •  CKD:  [ ] Stage I   [ ] Stage II  [ ] Stage III    [ ]Stage IV   [ ]  CKD V   [ ]  Other/Unspecified:    •  Abdominal Nutritional Status:   [ ] Malnutrition-See Nutrition note    [ ] Cachexia   [ ]  Other        [ ] Supplement ordered:            [ ] Morbid Obesity: BMI >=40         ASSESSMENT/PLAN:      STATUS POST: L ankle ORIF POD#1  h/h stable  pt reports pain tolerable on current pain regimen  PT eval'd pt for home, no skilled therapy services needed, pending clearance   as per dr. stone, pt will be d/c on lovenox for 1 week then transition to asa bid until 6 wks postop     CONTINUE:          [ ] PT- nwb LLE     [ ] DVT PPX- lovenox 40 sq daily    [ ] Pain Mgt- po meds    [ ] Dispo plan- home to [facility] when passes PT

## 2018-03-19 NOTE — PHYSICAL THERAPY INITIAL EVALUATION ADULT - PERTINENT HX OF CURRENT PROBLEM, REHAB EVAL
Pt is 38yo male visiting from [facility], broke his left ankle while ice skating, now s/p left ankle ORIF on 3/18/18.

## 2018-03-19 NOTE — PHYSICAL THERAPY INITIAL EVALUATION ADULT - MANUAL MUSCLE TESTING RESULTS, REHAB EVAL
grossly at least 3+/5 throughout BUE and BLE observed through functional activities, except left ankle not tested. Pt able to perform SLR on BLE.

## 2018-03-19 NOTE — PHYSICAL THERAPY INITIAL EVALUATION ADULT - GAIT DEVIATIONS NOTED, PT EVAL
increased time in double stance/decreased stride length/decreased tameka/decreased step length/increased stride width/decreased weight-shifting ability

## 2018-03-19 NOTE — PHYSICAL THERAPY INITIAL EVALUATION ADULT - ADDITIONAL COMMENTS
Pt is from [facility], staying at [place] hotel with wife. Pt does not own any equipment.

## 2018-03-19 NOTE — PHYSICAL THERAPY INITIAL EVALUATION ADULT - PLANNED THERAPY INTERVENTIONS, PT EVAL
transfer training/manual therapy techniques/strengthening/balance training/bed mobility training/lumbar stabilization/gait training

## 2018-03-19 NOTE — PROGRESS NOTE ADULT - SUBJECTIVE AND OBJECTIVE BOX
S: No acute events overnight.    Vital Signs Last 24 Hrs  T(C): 36.2 (19 Mar 2018 01:13), Max: 37 (18 Mar 2018 21:31)  T(F): 97.2 (19 Mar 2018 01:13), Max: 98.6 (18 Mar 2018 21:31)  HR: 96 (19 Mar 2018 01:13) (70 - 96)  BP: 115/74 (19 Mar 2018 01:13) (107/67 - 150/78)  BP(mean): 91 (18 Mar 2018 21:00) (83 - 113)  RR: 16 (19 Mar 2018 01:13) (15 - 18)  SpO2: 96% (19 Mar 2018 01:13) (96% - 100%)  I&O's Summary    17 Mar 2018 07:01  -  18 Mar 2018 07:00  --------------------------------------------------------  IN: 0 mL / OUT: 450 mL / NET: -450 mL    18 Mar 2018 07:01  -  19 Mar 2018 05:24  --------------------------------------------------------  IN: 1485 mL / OUT: 950 mL / NET: 535 mL        Splint in place LLE. Elevated  Motor: Wiggling toes   Sensation: SILT sural, saph, DP, SP and tibial n distribution  Pulses: WWP toes                          13.4   12.8  )-----------( 161      ( 18 Mar 2018 06:30 )             40.6     03-18    136  |  97  |  15  ----------------------------<  138<H>  4.1   |  25  |  0.82    Ca    9.3      18 Mar 2018 06:30    TPro  8.1  /  Alb  5.1<H>  /  TBili  0.4  /  DBili  x   /  AST  21  /  ALT  23  /  AlkPhos  96  03-17      MEDICATIONS  (STANDING):  acetaminophen   Tablet. 650 milliGRAM(s) Oral every 6 hours  ceFAZolin   IVPB 2000 milliGRAM(s) IV Intermittent every 8 hours  enoxaparin Injectable 40 milliGRAM(s) SubCutaneous daily  famotidine    Tablet 20 milliGRAM(s) Oral every 12 hours  influenza   Vaccine 0.5 milliLiter(s) IntraMuscular once  lactated ringers. 1000 milliLiter(s) (70 mL/Hr) IV Continuous <Continuous>  lactated ringers. 1000 milliLiter(s) (100 mL/Hr) IV Continuous <Continuous>    MEDICATIONS  (PRN):  acetaminophen   Tablet 650 milliGRAM(s) Oral every 6 hours PRN For Temp greater than 38 C (100.4 F)  bisacodyl Suppository 10 milliGRAM(s) Rectal daily PRN If no bowel movement by POD#2  HYDROmorphone  Injectable 0.5 milliGRAM(s) IV Push every 10 minutes PRN PACU PAIN ONLY  HYDROmorphone  Injectable 0.5 milliGRAM(s) IV Push every 2 hours PRN Breakthrough pain  magnesium hydroxide Suspension 30 milliLiter(s) Oral daily PRN Constipation  metoclopramide Injectable 10 milliGRAM(s) IV Push every 6 hours PRN Nausea and/or Vomiting  ondansetron Injectable 4 milliGRAM(s) IV Push every 6 hours PRN Nausea and/or Vomiting  oxyCODONE    IR 5 milliGRAM(s) Oral every 4 hours PRN Moderate Pain (4 - 6)  oxyCODONE    IR 10 milliGRAM(s) Oral every 4 hours PRN Severe Pain (7 - 10)  zolpidem 5 milliGRAM(s) Oral at bedtime PRN Insomnia      A/P:  37y Male s/p L ankle ORIF, POD 1  -Stable  -Pain Control  -PT/NWB  -DVT ppx: Lovenox  -f/u AM labs  -Dispo: Home ([facility])

## 2018-03-20 VITALS
RESPIRATION RATE: 15 BRPM | HEART RATE: 76 BPM | TEMPERATURE: 98 F | DIASTOLIC BLOOD PRESSURE: 92 MMHG | SYSTOLIC BLOOD PRESSURE: 132 MMHG | OXYGEN SATURATION: 100 %

## 2018-03-20 RX ORDER — ENOXAPARIN SODIUM 100 MG/ML
40 INJECTION SUBCUTANEOUS
Qty: 11 | Refills: 0 | OUTPATIENT
Start: 2018-03-20 | End: 2018-03-30

## 2018-03-20 RX ADMIN — ENOXAPARIN SODIUM 40 MILLIGRAM(S): 100 INJECTION SUBCUTANEOUS at 11:58

## 2018-03-20 RX ADMIN — Medication 650 MILLIGRAM(S): at 12:45

## 2018-03-20 RX ADMIN — Medication 650 MILLIGRAM(S): at 05:48

## 2018-03-20 RX ADMIN — OXYCODONE HYDROCHLORIDE 10 MILLIGRAM(S): 5 TABLET ORAL at 06:40

## 2018-03-20 RX ADMIN — HYDROMORPHONE HYDROCHLORIDE 0.5 MILLIGRAM(S): 2 INJECTION INTRAMUSCULAR; INTRAVENOUS; SUBCUTANEOUS at 00:15

## 2018-03-20 RX ADMIN — OXYCODONE HYDROCHLORIDE 10 MILLIGRAM(S): 5 TABLET ORAL at 11:30

## 2018-03-20 RX ADMIN — HYDROMORPHONE HYDROCHLORIDE 0.5 MILLIGRAM(S): 2 INJECTION INTRAMUSCULAR; INTRAVENOUS; SUBCUTANEOUS at 00:30

## 2018-03-20 RX ADMIN — Medication 650 MILLIGRAM(S): at 11:58

## 2018-03-20 RX ADMIN — FAMOTIDINE 20 MILLIGRAM(S): 10 INJECTION INTRAVENOUS at 05:48

## 2018-03-20 RX ADMIN — OXYCODONE HYDROCHLORIDE 10 MILLIGRAM(S): 5 TABLET ORAL at 05:48

## 2018-03-20 RX ADMIN — Medication 650 MILLIGRAM(S): at 06:48

## 2018-03-20 RX ADMIN — OXYCODONE HYDROCHLORIDE 10 MILLIGRAM(S): 5 TABLET ORAL at 10:37

## 2018-03-20 NOTE — DISCHARGE NOTE ADULT - MEDICATION SUMMARY - MEDICATIONS TO TAKE
I will START or STAY ON the medications listed below when I get home from the hospital:    Ecotrin 325 mg oral delayed release tablet  -- 1 tab(s) by mouth 2 times a day for 4 weeks   *take AFTER completion of Lovenox injections. Start on April 1, 2018  -- Swallow whole.  Do not crush.  Take with food or milk.    -- Indication: For prevent blood clots *start on April 1, 2018-after completion of lovenox injections*     oxyCODONE-acetaminophen 10 mg-325 mg oral tablet  -- 1 tab(s) by mouth every 4 hours as needed for moderate to severe pain MDD:6   -- Caution federal law prohibits the transfer of this drug to any person other  than the person for whom it was prescribed.  May cause drowsiness.  Alcohol may intensify this effect.  Use care when operating dangerous machinery.  This prescription cannot be refilled.  This product contains acetaminophen.  Do not use  with any other product containing acetaminophen to prevent possible liver damage.  Using more of this medication than prescribed may cause serious breathing problems.    -- Indication: For moderate to severe pain    Lovenox 40 mg/0.4 mL injectable solution  -- 40 milligram(s) subcutaneously once a day   *Last day of administration is 3/31/18*     -- It is very important that you take or use this exactly as directed.  Do not skip doses or discontinue unless directed by your doctor.    -- Indication: For prevent blood clots  *last day of administration 3/31/18*    bisacodyl 10 mg rectal suppository  -- 1 suppository(ies) rectally once a day, As needed, If no bowel movement by POD#2  -- Indication: For constipation

## 2018-03-20 NOTE — DISCHARGE NOTE ADULT - CARE PLAN
Goal:	improvement in ambulation and decreased pain after surgery  Assessment and plan of treatment:	see below

## 2018-03-20 NOTE — DISCHARGE NOTE ADULT - DURABLE MEDICAL EQUIPMENT AGENCY
[facility] [phone]  Rolling walker  ( Hospital Social Work dept will cover cost) Axillary Crutches will be provided by the hospital physical therapy dept

## 2018-03-20 NOTE — DISCHARGE NOTE ADULT - PATIENT PORTAL LINK FT
You can access the [facility], offered by [facility], by registering with the following website: [email or web address]

## 2018-03-20 NOTE — PROGRESS NOTE ADULT - SUBJECTIVE AND OBJECTIVE BOX
S: No acute events overnight.    Vital Signs Last 24 Hrs  T(C): 36.7 (20 Mar 2018 05:31), Max: 36.9 (19 Mar 2018 15:38)  T(F): 98 (20 Mar 2018 05:31), Max: 98.4 (19 Mar 2018 15:38)  HR: 70 (20 Mar 2018 05:31) (70 - 107)  BP: 108/72 (20 Mar 2018 05:31) (108/72 - 132/85)  BP(mean): --  RR: 16 (20 Mar 2018 05:31) (15 - 16)  SpO2: 96% (20 Mar 2018 05:31) (94% - 97%)    I&O's Summary    18 Mar 2018 07:01  -  19 Mar 2018 07:00  --------------------------------------------------------  IN: 2215 mL / OUT: 950 mL / NET: 1265 mL    19 Mar 2018 07:01  -  20 Mar 2018 05:41  --------------------------------------------------------  IN: 900 mL / OUT: 1180 mL / NET: -280 mL        Splint in place  Motor: Wiggling toes  Sensation: SILT sural, saph, DP, SP and tibial n distribution  Pulses: WWP toes                          13.0   9.4   )-----------( 146      ( 19 Mar 2018 05:42 )             39.6     03-19    138  |  100  |  8   ----------------------------<  173<H>  4.8   |  25  |  0.75    Ca    9.2      19 Mar 2018 05:42        MEDICATIONS  (STANDING):  acetaminophen   Tablet. 650 milliGRAM(s) Oral every 6 hours  enoxaparin Injectable 40 milliGRAM(s) SubCutaneous daily  famotidine    Tablet 20 milliGRAM(s) Oral every 12 hours  influenza   Vaccine 0.5 milliLiter(s) IntraMuscular once  lactated ringers. 1000 milliLiter(s) (70 mL/Hr) IV Continuous <Continuous>  lactated ringers. 1000 milliLiter(s) (100 mL/Hr) IV Continuous <Continuous>    MEDICATIONS  (PRN):  acetaminophen   Tablet 650 milliGRAM(s) Oral every 6 hours PRN For Temp greater than 38 C (100.4 F)  bisacodyl Suppository 10 milliGRAM(s) Rectal daily PRN If no bowel movement by POD#2  HYDROmorphone  Injectable 0.5 milliGRAM(s) IV Push every 10 minutes PRN PACU PAIN ONLY  HYDROmorphone  Injectable 0.5 milliGRAM(s) IV Push every 2 hours PRN Breakthrough pain  magnesium hydroxide Suspension 30 milliLiter(s) Oral daily PRN Constipation  metoclopramide Injectable 10 milliGRAM(s) IV Push every 6 hours PRN Nausea and/or Vomiting  ondansetron Injectable 4 milliGRAM(s) IV Push every 6 hours PRN Nausea and/or Vomiting  oxyCODONE    IR 5 milliGRAM(s) Oral every 4 hours PRN Moderate Pain (4 - 6)  oxyCODONE    IR 10 milliGRAM(s) Oral every 4 hours PRN Severe Pain (7 - 10)  zolpidem 5 milliGRAM(s) Oral at bedtime PRN Insomnia      A/P:  37y Male s/p ORIF L ankle POD 2  -Stable  -Pain Control  -PT/NWB  -DVT ppx: Lovenox  -Dispo: HPT. Flying back to [facility] in [facility] 2 hrs before the flight

## 2018-03-20 NOTE — DISCHARGE NOTE ADULT - CARE PROVIDER_API CALL
Michael Beck), Orthopaedic Surgery  [address]  [address] [place] [address]  Phone: [phone]  Fax: [phone]

## 2018-03-20 NOTE — DISCHARGE NOTE ADULT - FUNCTIONAL SCREEN CURRENT LEVEL: BATHING, MLM
I tried to reach Ms. Miguel Caballero again today regarding her concerns of the CTA that was ordered for her.  There was no answer and I left a second message for her to call clinic if she has concerns
(2) assistive person

## 2018-03-20 NOTE — DISCHARGE NOTE ADULT - ADDITIONAL INSTRUCTIONS
You are non- weight bearing as tolerated to left leg.   No strenuous activity, heavy lifting, driving, tub bathing, or returning to work until cleared by MD.  If you don't have a bowel movement by post op day 3, then take Milk of Magnesia (over the counter).  If no bowel movement by at least post op day 5, then use a Dulcolax suppository (over the counter) and/or a Fleets enema--if still no bowel movement, call your MD.  Contact your doctor if you experience: fever greater than 101.5, chills, chest pain, difficulty breathing, bleeding, redness or heat around the incision.  Follow up with your primary care provider with an orthopedist in [facility] 2 weeks from surgery (first week of April) for suture removal and splint change/xray as per Dr. Beck You are non- weight bearing as tolerated to left leg. Keep left leg elevated while in bed.  No strenuous activity, heavy lifting, driving, tub bathing, or returning to work until cleared by MD.  If you don't have a bowel movement by post op day 3, then take Milk of Magnesia (over the counter).  If no bowel movement by at least post op day 5, then use a Dulcolax suppository (over the counter) and/or a Fleets enema--if still no bowel movement, call your MD.  Contact your doctor if you experience: fever greater than 101.5, chills, chest pain, difficulty breathing, bleeding, redness or heat around the incision.  Follow up with your primary care provider with an orthopedist in [facility] 2 weeks from surgery (first week of April) for suture removal and splint change/xray as per Dr. Beck

## 2018-03-21 RX ORDER — ENOXAPARIN SODIUM 100 MG/ML
40 INJECTION SUBCUTANEOUS
Qty: 11 | Refills: 0 | OUTPATIENT
Start: 2018-03-21 | End: 2018-03-31
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
## 2018-03-22 DIAGNOSIS — S82.892A OTHER FRACTURE OF LEFT LOWER LEG, INITIAL ENCOUNTER FOR CLOSED FRACTURE: ICD-10-CM

## 2018-03-26 PROCEDURE — 76000 FLUOROSCOPY <1 HR PHYS/QHP: CPT

## 2018-03-26 PROCEDURE — 85610 PROTHROMBIN TIME: CPT

## 2018-03-26 PROCEDURE — 86850 RBC ANTIBODY SCREEN: CPT

## 2018-03-26 PROCEDURE — 36415 COLL VENOUS BLD VENIPUNCTURE: CPT

## 2018-03-26 PROCEDURE — 87641 MR-STAPH DNA AMP PROBE: CPT

## 2018-03-26 PROCEDURE — 71045 X-RAY EXAM CHEST 1 VIEW: CPT

## 2018-03-26 PROCEDURE — 97116 GAIT TRAINING THERAPY: CPT

## 2018-03-26 PROCEDURE — 73590 X-RAY EXAM OF LOWER LEG: CPT

## 2018-03-26 PROCEDURE — 73610 X-RAY EXAM OF ANKLE: CPT

## 2018-03-26 PROCEDURE — 85027 COMPLETE CBC AUTOMATED: CPT

## 2018-03-26 PROCEDURE — C1713: CPT

## 2018-03-26 PROCEDURE — 93005 ELECTROCARDIOGRAM TRACING: CPT

## 2018-03-26 PROCEDURE — 80053 COMPREHEN METABOLIC PANEL: CPT

## 2018-03-26 PROCEDURE — 80048 BASIC METABOLIC PNL TOTAL CA: CPT

## 2018-03-26 PROCEDURE — 96374 THER/PROPH/DIAG INJ IV PUSH: CPT

## 2018-03-26 PROCEDURE — 99285 EMERGENCY DEPT VISIT HI MDM: CPT | Mod: 25

## 2018-03-26 PROCEDURE — 86901 BLOOD TYPING SEROLOGIC RH(D): CPT

## 2018-03-26 PROCEDURE — 85025 COMPLETE CBC W/AUTO DIFF WBC: CPT

## 2018-03-26 PROCEDURE — 96372 THER/PROPH/DIAG INJ SC/IM: CPT | Mod: XU

## 2018-03-26 PROCEDURE — 86900 BLOOD TYPING SEROLOGIC ABO: CPT

## 2018-03-26 PROCEDURE — 81003 URINALYSIS AUTO W/O SCOPE: CPT

## 2018-03-26 PROCEDURE — 85730 THROMBOPLASTIN TIME PARTIAL: CPT

## 2018-03-26 PROCEDURE — 97161 PT EVAL LOW COMPLEX 20 MIN: CPT

## 2018-04-01 RX ORDER — ASPIRIN/CALCIUM CARB/MAGNESIUM 324 MG
1 TABLET ORAL
Qty: 60 | Refills: 0 | OUTPATIENT
Start: 2018-04-01 | End: 2018-04-30

## 2024-02-09 NOTE — PROGRESS NOTE ADULT - PROVIDER SPECIALTY LIST ADULT
Orthopedics Refill Request for: Focalin XR 10 mg    Last Seen: 12/20/2023 acute and 3/1/2023 med check    Last Refilled: 12/11/2023    Upcoming appointments: 5/17/2024 with adult provider    PDMP date checked 2/9/2024, last dispensed 1/18/2024